# Patient Record
(demographics unavailable — no encounter records)

---

## 2024-12-27 NOTE — ASSESSMENT
[FreeTextEntry1] : Complete skin examination is negative for malignancy; Multiple new concerns were addressed and discussed. Therapeutic options and their risks and benefits; along with multiple diagnostic possibilities were discussed at length; risks and benefits of skin biopsy and/or other further study were discussed;  Continue regular exams; Follow up for TBSE in 1 year  Multiple nevi

## 2024-12-27 NOTE — HISTORY OF PRESENT ILLNESS
[de-identified] : Pt. presents for skin check; c/o few spots of concern;   Severity:  mild   Modifying factors:  none Associated symptoms:  none Context:  no association with activity

## 2024-12-27 NOTE — PHYSICAL EXAM
[Full Body Skin Exam Performed] : performed [FreeTextEntry3] : Skin examination performed of the face, neck, trunk, arms, legs;  The patient is well, alert and oriented, pleasant and cooperative. Eyelids, conjunctivae, oral mucosa, digits and nails all normal.   No cervical adenopathy.  Normal findings include:  Multiple benign nevi were noted. small nevi on trunk, UEs Angiomas Lentigines  No lesions were suspicious for malignancy.